# Patient Record
Sex: MALE
[De-identification: names, ages, dates, MRNs, and addresses within clinical notes are randomized per-mention and may not be internally consistent; named-entity substitution may affect disease eponyms.]

---

## 2018-07-11 ENCOUNTER — HOSPITAL ENCOUNTER (INPATIENT)
Dept: HOSPITAL 31 - C.ER | Age: 78
LOS: 5 days | Discharge: HOME | DRG: 286 | End: 2018-07-16
Attending: INTERNAL MEDICINE | Admitting: INTERNAL MEDICINE
Payer: MEDICARE

## 2018-07-11 DIAGNOSIS — Z90.49: ICD-10-CM

## 2018-07-11 DIAGNOSIS — E11.9: ICD-10-CM

## 2018-07-11 DIAGNOSIS — I11.0: Primary | ICD-10-CM

## 2018-07-11 DIAGNOSIS — E78.5: ICD-10-CM

## 2018-07-11 DIAGNOSIS — N40.0: ICD-10-CM

## 2018-07-11 DIAGNOSIS — Z79.84: ICD-10-CM

## 2018-07-11 DIAGNOSIS — D61.818: ICD-10-CM

## 2018-07-11 DIAGNOSIS — I50.31: ICD-10-CM

## 2018-07-11 DIAGNOSIS — E78.00: ICD-10-CM

## 2018-07-11 DIAGNOSIS — I08.0: ICD-10-CM

## 2018-07-11 DIAGNOSIS — I25.10: ICD-10-CM

## 2018-07-11 DIAGNOSIS — I71.03: ICD-10-CM

## 2018-07-11 LAB
ALBUMIN SERPL-MCNC: 4.5 G/DL (ref 3.5–5)
ALBUMIN/GLOB SERPL: 1.8 {RATIO} (ref 1–2.1)
ALT SERPL-CCNC: 21 U/L (ref 21–72)
ANISOCYTOSIS BLD QL SMEAR: SLIGHT
APTT BLD: 32 SECONDS (ref 21–34)
AST SERPL-CCNC: 18 U/L (ref 17–59)
BASOPHILS # BLD AUTO: 0 K/UL (ref 0–0.2)
BASOPHILS NFR BLD: 0 % (ref 0–2)
BUN SERPL-MCNC: 21 MG/DL (ref 9–20)
CALCIUM SERPL-MCNC: 9.1 MG/DL (ref 8.6–10.4)
CK MB SERPL-MCNC: 0.99 NG/ML (ref 0–3.38)
EOSINOPHIL # BLD AUTO: 0 K/UL (ref 0–0.7)
EOSINOPHIL NFR BLD: 0 % (ref 0–4)
ERYTHROCYTE [DISTWIDTH] IN BLOOD BY AUTOMATED COUNT: 18.4 % (ref 11.5–14.5)
GFR NON-AFRICAN AMERICAN: 42
HGB BLD-MCNC: 9.2 G/DL (ref 12–18)
HYPOCHROMIC: SLIGHT
INR PPP: 1.4
LYMPHOCYTE: 26 % (ref 20–40)
LYMPHOCYTES # BLD AUTO: 1.3 K/UL (ref 1–4.3)
LYMPHOCYTES NFR BLD AUTO: 31.1 % (ref 20–40)
MCH RBC QN AUTO: 25.6 PG (ref 27–31)
MCHC RBC AUTO-ENTMCNC: 32.5 G/DL (ref 33–37)
MCV RBC AUTO: 78.8 FL (ref 80–94)
MONOCYTE: 8 % (ref 0–10)
MONOCYTES # BLD: 0.9 K/UL (ref 0–0.8)
MONOCYTES NFR BLD: 21.3 % (ref 0–10)
NEUTROPHILS # BLD: 1.9 K/UL (ref 1.8–7)
NEUTROPHILS NFR BLD AUTO: 47.6 % (ref 50–75)
NEUTROPHILS NFR BLD AUTO: 66 % (ref 50–75)
NRBC BLD AUTO-RTO: 0.1 % (ref 0–2)
OVALOCYTES BLD QL SMEAR: SLIGHT
PLATELET # BLD EST: (no result) 10*3/UL
PLATELET # BLD: 81 K/UL (ref 130–400)
PMV BLD AUTO: 9.5 FL (ref 7.2–11.7)
POIKILOCYTOSIS BLD QL SMEAR: SLIGHT
POLYCHROMIC: SLIGHT
PROTHROMBIN TIME: 14.9 SECONDS (ref 9.7–12.2)
RBC # BLD AUTO: 3.6 MIL/UL (ref 4.4–5.9)
TEARDROP CELLS: SLIGHT
TOTAL CELLS COUNTED BLD: 100
TROPONIN I SERPL-MCNC: 0.02 NG/ML (ref 0–0.12)
WBC # BLD AUTO: 4 K/UL (ref 4.8–10.8)

## 2018-07-11 RX ADMIN — NITROGLYCERIN SCH EA: 20 OINTMENT TOPICAL at 18:27

## 2018-07-11 NOTE — RAD
Date of service: 



07/11/2018



PROCEDURE:  CHEST RADIOGRAPH, 1 VIEW



HISTORY:

SOB



COMPARISON:

CT angiography of the chest, abdomen pelvis performed 12/30/2015.



FINDINGS:



LUNGS:

Pulmonary vascular congestion. Questionable patchy bibasilar 

infiltrates versus chronic changes.



PLEURA:

No pneumothorax or pleural fluid seen.



CARDIOVASCULAR:

Atherosclerotic aortic calcifications.  Cardiomediastinal silhouette 

enlarged.



OSSEOUS STRUCTURES:

Degenerative changes.



VISUALIZED UPPER ABDOMEN:

Normal.



OTHER FINDINGS:

None. 



IMPRESSION:

Pulmonary vascular congestion.  Questionable patchy bibasilar 

infiltrates versus chronic changes.

## 2018-07-11 NOTE — C.PDOC
History Of Present Illness





Patient presents to ED c/o SOB worse with laying flat over the past 1 week.  He 

admits to nonproductive cough, but denies chest pain, fever, abdominal pain, 

palpitations, nausea/vomiting, leg edema.  


Time Seen by Provider: 07/11/18 11:45


Chief Complaint (Nursing): Shortness Of Breath


History Per: Patient


History/Exam Limitations: no limitations


Onset/Duration Of Symptoms: Days


Current Symptoms Are (Timing): Still Present


Exacerbating Factor(s): Coughing


Current Respiratory Medications: See Home Med List


Severity: Moderate





Past Medical History


Reviewed: Historical Data, Nursing Documentation, Vital Signs


Vital Signs: 


 Last Vital Signs











Temp      


 


Pulse      


 


Resp  20   07/11/18 12:17


 


BP      


 


Pulse Ox  98   07/11/18 12:57














- Medical History


PMH: Arthritis, Benign Prostatic Hyperplasia, Diabetes (type 2), HTN, 

Hypercholesterolemia


   Denies: Chronic Kidney Disease


Surgical History: Appendectomy





- [facility] Procedures








VACCINATION NEC (04/24/15)








Family History: States: No Known Family Hx





- Social History


Hx Tobacco Use: No


Hx Alcohol Use: No


Hx Substance Use: No





- Immunization History


Hx Tetanus Toxoid Vaccination: No


Hx Influenza Vaccination: No


Hx Pneumococcal Vaccination: No





Review Of Systems


Constitutional: Negative for: Fever, Chills


Cardiovascular: Positive for: Orthopnea.  Negative for: Chest Pain, Palpitations


Respiratory: Positive for: Cough, Shortness of Breath


Gastrointestinal: Negative for: Nausea, Vomiting, Abdominal Pain, Diarrhea


Skin: Negative for: Rash


Neurological: Negative for: Weakness, Numbness





Physical Exam





- Physical Exam


Appears: Well, Non-toxic, No Acute Distress, Other (speaking in full sentences)


Head: Atraumatic, Normacephalic


Eye(s): bilateral: Normal Inspection


Oral Mucosa: Moist


Cardiovascular: Rhythm Regular


Respiratory: No Accessory Muscle Use, Rales (mild at bases B/L ), Rhonchi (

scattered rhonchi B/L ), No Wheezing


Gastrointestinal/Abdominal: Normal Exam, Bowel Sounds, Soft, No Tenderness


Extremity: Normal ROM, No Pedal Edema, No Calf Tenderness


Neurological/Psych: Oriented x3





ED Course And Treatment





- Laboratory Results


Result Diagrams: 


 07/11/18 12:34





 07/11/18 12:34


ECG: Interpreted By Me, Viewed By Me (sinus bradycardia 58 bpm, normal axis, no 

acute ST/T wave changes)


ECG Interpretation: No Acute Changes


O2 Sat by Pulse Oximetry: 98 (RA)


Pulse Ox Interpretation: Normal


Progress Note: Blood work, CXR, EKG, ordered and reviewed.





Disposition





- Disposition


Disposition Time: 13:00


Condition: STABLE


Forms:  [facility] [facility] (English)





- Clinical Impression


Clinical Impression: 


 Dyspnea, Orthopnea








Physician Patient Turnover


Patient Signed Over To: Racquel Watson


Handoff Comments: pending labs, reassess

## 2018-07-12 RX ADMIN — NITROGLYCERIN SCH EA: 20 OINTMENT TOPICAL at 12:37

## 2018-07-12 RX ADMIN — NITROGLYCERIN SCH EA: 20 OINTMENT TOPICAL at 05:26

## 2018-07-12 RX ADMIN — NITROGLYCERIN SCH EA: 20 OINTMENT TOPICAL at 00:22

## 2018-07-12 RX ADMIN — NITROGLYCERIN SCH EA: 20 OINTMENT TOPICAL at 18:27

## 2018-07-12 RX ADMIN — PANTOPRAZOLE SODIUM SCH MG: 40 TABLET, DELAYED RELEASE ORAL at 09:48

## 2018-07-12 NOTE — CP.PCM.PN
Subjective





- Date & Time of Evaluation


Date of Evaluation: 07/12/18


Time of Evaluation: 19:55





- Subjective


Subjective: 





Patient with symptomatic valvular heart disease


Decreased EF





FABIO and Cath tomorrow to assess valves and Caronaries





Objective





- Vital Signs/Intake and Output


Vital Signs (last 24 hours): 


 











Temp Pulse Resp BP Pulse Ox


 


 97.3 F L  59 L  20   144/84   98 


 


 07/12/18 15:39  07/12/18 15:39  07/12/18 15:39  07/12/18 15:39  07/12/18 15:39








Intake and Output: 


 











 07/12/18 07/13/18





 18:59 06:59


 


Intake Total 350 


 


Balance 350 














- Medications


Medications: 


 Current Medications





Aspirin (Ecotrin)  81 mg PO DAILY [facility]


   Last Admin: 07/12/18 09:45 Dose:  81 mg


Furosemide (Lasix)  20 mg IVP DAILY [facility]


   Last Admin: 07/12/18 09:44 Dose:  20 mg


Furosemide (Lasix)  20 mg IVP ONCE ONE


   Stop: 07/13/18 09:01


Heparin Sodium (Porcine) (Heparin)  5,000 units SC Q8 [facility]


   Last Admin: 07/12/18 13:23 Dose:  5,000 units


Sodium Chloride (Sodium Chloride 0.9%)  1,000 mls @ 50 mls/hr IV .Q20H [facility]


Losartan Potassium (Cozaar)  25 mg PO DAILY [facility]


   Last Admin: 07/12/18 09:45 Dose:  25 mg


Nitroglycerin (Nitro-Bid 2% Oint)  1 ea TOP Q6 [facility]


   Last Admin: 07/12/18 18:27 Dose:  1 ea


Pantoprazole Sodium (Protonix Ec Tab)  40 mg PO DAILY [facility]


   Last Admin: 07/12/18 09:48 Dose:  40 mg











- Labs


Labs: 


 





 07/11/18 12:34 





 07/11/18 12:34 





 











PT  14.9 SECONDS (9.7-12.2)  H  07/11/18  12:34    


 


INR  1.4   07/11/18  12:34    


 


APTT  32 SECONDS (21-34)   07/11/18  12:34

## 2018-07-12 NOTE — HP
HISTORY OF PRESENT ILLNESS:  A 78-year-old gentleman who was brought in

with a history of shortness of breath, that started last night.  He has

multiple medical problems which includes history of hypertension, diabetes,

high cholesterol, atherosclerosis.  The patient also carries a diagnosis of

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 

This was diagnosed in 08/2015, three years ago.  He was in usual state of

health up until last night when he could not sleep, had a cough and

shortness of breath, came to the emergency room but his chest x-ray had

shown mild vascular congestion and the proBNP was elevated to 9000.  His

hemoglobin was 9.2.



MEDICATIONS AT HOME:  Include baby aspirin one a day, Crestor 20 one a day,

Lovaza, metformin 500 mg twice a day.



PAST MEDICAL HISTORY:  He had a cardiac cath done many years ago.  He had a

nonobstructive coronary artery disease.  Echocardiogram was recently done,

few months ago, had shown dilated LV, mild to moderate AI, mild to moderate

MR, grade 2 LV diastolic dysfunction.  He previously also has been seen by

Dr. Chapa.



FAMILY HISTORY:  Negative for premature coronary artery disease.



REVIEW OF SYSTEMS:  No generalized weakness, no fever, no chills, no

dizziness, except when he walks around in the hot weather.  Otherwise,

unremarkable.  No syncope.  Nonproductive cough for 2 days.  No hemoptysis.

Denies any chest pain.  Able to walk and do routine activities, 3 to 4

block on a level ground.  No palpitations.  Neurologically, no TIAs, no

CVAs.  No peptic ulcer disease.  No bleeding disorders.  Does have a

history of arthritis and back pains and knee pains.  There is no evidence

of depression.  Denies any urinary complaints.  He does have erectile

dysfunction, has been seen and evaluated by urologist in the past.



PHYSICAL EXAMINATION:

GENERAL:  Shows elderly gentleman, conscious, alert, well oriented, in no

acute distress.

VITAL SIGNS:  He is 5 feet 7 inches, weighs 192 pounds.  His blood pressure

is 136/70, heart rate of 56 and regular, sinus rhythm on the telemetry,

respiratory rate of 20, afebrile.

HEENT:  No neck vein distentions.  No carotid bruits.

MOUTH:  Mouth shows absence of all teeth.

EYES:  No pallor, no icterus.

HEAD:  Normocephalic.

LUNGS:  Show few basilar rales.  Otherwise unremarkable.

HEART:  PMI is not localized.  S1 and S2 appear normal.  There are no

gallops.  There is a grade 2/6 holosystolic murmur in mitral area.  No

diastolic murmur heard.  No S3 gallop.

ABDOMEN:  Soft, nontender.

EXTREMITIES:  No cyanosis, clubbing, or edema.  Distal pulses are intact.

NEUROLOGIC:  Awake, alert, oriented x3.  No focal signs.

PSYCHIATRIC:  No evidence of depression.



LABORATORY DATA:  Hemoglobin is 9.2.  Chest x-ray shows mild congestion. 

Chem-7 is unremarkable.  Bilirubin is elevated at 2.  EKG:  Sinus

bradycardia, LVH, nonspecific ST-T changes.  Troponins are not available.



ASSESSMENT:  This is a 78-year-old gentleman with a history of chronic

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

hypertension, diabetes, atherosclerosis, who is presenting with mild

congestive heart failure, appears to be more of diastolic, hemodynamically

stable.



PLAN:  At this point is to give small dose of diuretic.  We will repeat

echocardiogram with a Doppler study and repeat CT, angio of chest and

abdomen.  May need vascular consultation.  Care of plan was explained to

the patient.  Also, we will do anemia workup.





__________________________________________

Sathya Jacinto MD





DD:  07/11/2018 14:40:11

DT:  07/11/2018 23:09:11

Job # [id number]

## 2018-07-12 NOTE — CARD
--------------- APPROVED REPORT --------------





Date of service: 07/11/2018



EXAM: Two-dimensional and M-mode echocardiogram with Doppler and 

color Doppler.



Other Information 

Quality : GoodRhythm : 



INDICATION

Aortic Valve Disease Dyspnea Chest Pain Congestive Heart Failure 

Aortic Dissection



2D DIMENSIONS 

IVSd1.6   (0.7-1.1cm)LVDd5.5   (3.9-5.9cm)

PWd1.2   (0.7-1.1cm)LVDs4.7   (2.5-4.0cm)

FS (%) 14.8   %LVEF (%)50.0   (>50%)



M-Mode DIMENSIONS 

Left Atrium (MM)3.79   (2.5-4.0cm)IVSd1.22   (0.7-1.1cm)

Aortic Root4.18   (2.2-3.7cm)LVDd6.43   (4.0-5.6cm)

Aortic Cusp Exc.1.90   (1.5-2.0cm)PWd1.22   (0.7-1.1cm)

FS (%) 41   %LVDs3.79   (2.0-3.8cm)

LVEF (%)50   (>50%)



Aortic Valve

AI P 1/2 [id number]



Mitral Valve

MV E Goslfgez81.1cm/sMV A Kfvwqaqg35.4cm/sE/A ratio2.1



TDI

E/Lateral E'0.0E/Medial E'0.0



Tricuspid Valve

TR Peak [id number]/sTR Peak [id number]



 LEFT VENTRICLE 

The left ventricle is normal size.

There is normal left ventricular wall thickness. 

The left ventricular function is normal.

The left ventricular ejection fraction is within the normal range.

No regional wall motion abnormalities noted.

The left ventricular diastolic function is normal.

No left ventricle thrombus noted on this study.

There is no ventricular septal defect visualized.

There is no left ventricular aneurysm. 

There is no mass noted in the left ventricle.



 RIGHT VENTRICLE 

The right ventricle is normal size.

There is normal right ventricular wall thickness.

The right ventricular systolic function is normal.



 ATRIA 

The left atrium size is normal.

The right atrium size is normal.

The interatrial septum is intact with no evidence for an atrial 

septal defect.



 AORTIC VALVE 

The aortic valve is mildly to moderately thickened.

There is moderate aortic regurgitation.

There is no aortic valvular stenosis. 

There is no aortic valvular vegetation.



 MITRAL VALVE 

The mitral valve is normal in structure and function.

There is no evidence of mitral valve prolapse.

There is no mitral valve stenosis. 

The mitral regurgitant jet is posteriorly directed, which is 

consistent with anterior leaflet pathology.

The mitral regurgitant jet is anteriorly directed, which is 

consistent with posterior leaflet pathology.



 TRICUSPID VALVE 

The tricuspid valve is normal in structure and function.

There is moderate tricuspid regurgitation.

Right ventricular systolic pressure is estimated at greater than 60 

mmHg. 

There is no tricuspid valve prolapse or vegetation.

There is no tricuspid valve stenosis. 



 PULMONIC VALVE 

The pulmonary valve is normal in structure and function.

There is mild pulmonic valvular regurgitation. 

There is no pulmonic valvular stenosis.



 GREAT VESSELS 

The aortic root is normal in size.

The ascending aorta is normal in size.

The pulmonary artery is normal.

The IVC is dilated.

The IVC collapses <50% with inspiration.



 PERICARDIAL EFFUSION 

The pericardium appears normal.

There is no pleural effusion.



<Conclusion>

The left ventricular function is normal.

The left ventricular ejection fraction is within the normal range.

No regional wall motion abnormalities noted.

There is moderate aortic regurgitation.

The mitral regurgitant jet is posteriorly directed, which is 

consistent with anterior leaflet pathology.

The mitral regurgitant jet is anteriorly directed, which is 

consistent with posterior leaflet pathology.

There is moderate tricuspid regurgitation.

Right ventricular systolic pressure is estimated at greater than 60 

mmHg.

## 2018-07-12 NOTE — CP.PCM.PN
Subjective





- Date & Time of Evaluation


Date of Evaluation: 07/12/18


Time of Evaluation: 12:57





- Subjective


Subjective: 





no sob,no chest pains





Objective





- Vital Signs/Intake and Output


Vital Signs (last 24 hours): 


 











Temp Pulse Resp BP Pulse Ox


 


 97.8 F   58 L  20   157/80 H  96 


 


 07/12/18 07:40  07/12/18 09:41  07/12/18 07:40  07/12/18 09:44  07/12/18 07:40








Intake and Output: 


 











 07/12/18 07/12/18





 06:59 18:59


 


Output Total 425 


 


Balance -425 














- Medications


Medications: 


 Current Medications





Aspirin (Ecotrin)  81 mg PO DAILY [facility]


   Last Admin: 07/12/18 09:45 Dose:  81 mg


Furosemide (Lasix)  20 mg IVP DAILY [facility]


   Last Admin: 07/12/18 09:44 Dose:  20 mg


Heparin Sodium (Porcine) (Heparin)  5,000 units SC Q8 [facility]


   Last Admin: 07/12/18 05:27 Dose:  5,000 units


Sodium Chloride (Sodium Chloride 0.9%)  1,000 mls @ 50 mls/hr IV .Q20H [facility]


Losartan Potassium (Cozaar)  25 mg PO DAILY [facility]


   Last Admin: 07/12/18 09:45 Dose:  25 mg


Nitroglycerin (Nitro-Bid 2% Oint)  1 ea TOP Q6 [facility]


   Last Admin: 07/12/18 12:37 Dose:  1 ea


Pantoprazole Sodium (Protonix Ec Tab)  40 mg PO DAILY [facility]


   Last Admin: 07/12/18 09:48 Dose:  40 mg











- Labs


Labs: 


 





 07/11/18 12:34 





 07/11/18 12:34 





 











PT  14.9 SECONDS (9.7-12.2)  H  07/11/18  12:34    


 


INR  1.4   07/11/18  12:34    


 


APTT  32 SECONDS (21-34)   07/11/18  12:34    














- Constitutional


Appears: No Acute Distress





- Head Exam


Head Exam: NORMOCEPHALIC





- Eye Exam


Eye Exam: Normal appearance





- Neck Exam


Neck Exam: Normal Inspection





- Respiratory Exam


Respiratory Exam: Clear to Ausculation Bilateral





- Cardiovascular Exam


Cardiovascular Exam: REGULAR RHYTHM, Murmur





- GI/Abdominal Exam


GI & Abdominal Exam: Soft





- Extremities Exam


Extremities Exam: absent: Pedal Edema





- Neurological Exam


Neurological Exam: Alert, Oriented x3





Assessment and Plan





- Assessment and Plan (Free Text)


Assessment: 





echo noted.2-3 + ai,2-3 + mr,tr with severe pul htn.will obtain cynthia.given 

dilated lv with lvef of about 50% may need avr? & mitral ring.hem consult.

## 2018-07-12 NOTE — CP.PCM.CON
History of Present Illness





- History of Present Illness


History of Present Illness: 





78 year old male with a history of DM, HTN, HL, chronic aortic dissection type B

, presenting with shortness of breath, with pancytopenia.  The patient notes to 

low platelets in the past but denies abnormal bleeding and bruising.  He denies 

alcohol intake and notes his blood counts have been like this for years.  His 

breathing is improved.  





Past medical history:  DM, HTN, HL, chronic aortic dissection type B





Past surgical history:  Appendectomy





Family history:  Denies hematologic and oncologic problems





Social history:  Denies tobacco, alcohol, and illicit drug use





Allergies:  NKDA





Review of systems:  All remaining review of systems including HEENT, 

cardiovascular, respiratory, gastrointestinal, genitourinary, musculoskeletal, 

dermatologic, neurologic, and psychiatric are negative unless mentioned in the 

HPI.





Past Patient History





- Infectious Disease


Hx of Infectious Diseases: None





- Tetanus Immunizations


Tetanus Immunization: Unknown





- Past Medical History & Family History


Past Medical History?: Yes





- Past Social History


Smoking Status: Never Smoked





- CARDIAC


Hx Hypercholesterolemia: Yes


Hx Hypertension: Yes





- PULMONARY


Hx Respiratory Disorders: No





- NEUROLOGICAL


Hx Neurological Disorder: No





- HEENT


Hx HEENT Problems: No





- RENAL


Hx Chronic Kidney Disease: No





- ENDOCRINE/METABOLIC


Hx Endocrine Disorders: Yes


Hx Diabetes Mellitus Type 2: Yes





- HEMATOLOGICAL/ONCOLOGICAL


Hx Blood Disorders: No





- INTEGUMENTARY


Hx Dermatological Problems: No





- MUSCULOSKELETAL/RHEUMATOLOGICAL


Hx Arthritis: Yes


Hx Falls: No





- GASTROINTESTINAL


Hx Gastrointestinal Disorders: No





- GENITOURINARY/GYNECOLOGICAL


Hx Genitourinary Disorders: Yes


Hx Prostate Problems: Yes (incontinent)





- PSYCHIATRIC


Hx Substance Use: No





- SURGICAL HISTORY


Hx Appendectomy: Yes





- ANESTHESIA


Hx Anesthesia: Yes


Hx Anesthesia Reactions: No


Hx Malignant Hyperthermia: No





Meds


Allergies/Adverse Reactions: 


 Allergies











Allergy/AdvReac Type Severity Reaction Status Date / Time


 


polle Allergy Intermediate WHEEZING Uncoded 07/11/18 11:57














- Medications


Medications: 


 Current Medications





Aspirin (Ecotrin)  81 mg PO DAILY [facility]


   Last Admin: 07/12/18 09:45 Dose:  81 mg


Furosemide (Lasix)  20 mg IVP DAILY [facility]


   Last Admin: 07/12/18 09:44 Dose:  20 mg


Heparin Sodium (Porcine) (Heparin)  5,000 units SC Q8 [facility]


   Last Admin: 07/12/18 13:23 Dose:  5,000 units


Sodium Chloride (Sodium Chloride 0.9%)  1,000 mls @ 50 mls/hr IV .Q20H [facility]


Losartan Potassium (Cozaar)  25 mg PO DAILY [facility]


   Last Admin: 07/12/18 09:45 Dose:  25 mg


Nitroglycerin (Nitro-Bid 2% Oint)  1 ea TOP Q6 [facility]


   Last Admin: 07/12/18 12:37 Dose:  1 ea


Pantoprazole Sodium (Protonix Ec Tab)  40 mg PO DAILY [facility]


   Last Admin: 07/12/18 09:48 Dose:  40 mg











Physical Exam





- Head Exam


Head Exam: ATRAUMATIC





- Eye Exam


Eye Exam: Normal appearance





- ENT Exam


ENT Exam: Mucous Membranes Dry





- Respiratory Exam


Respiratory Exam: NORMAL BREATHING PATTERN





- Cardiovascular Exam


Cardiovascular Exam: +S1, +S2





- GI/Abdominal Exam


GI & Abdominal Exam: Normal Bowel Sounds





- Extremities Exam


Extremities exam: Positive for: normal inspection





Results





- Vital Signs


Recent Vital Signs: 


 Last Vital Signs











Temp  97.8 F   07/12/18 07:40


 


Pulse  58 L  07/12/18 09:41


 


Resp  20   07/12/18 07:40


 


BP  157/80 H  07/12/18 09:44


 


Pulse Ox  96   07/12/18 07:40














- Labs


Result Diagrams: 


 07/11/18 12:34





 07/11/18 12:34


Labs: 


 Laboratory Results - last 24 hr











  07/11/18 07/12/18 07/12/18





  21:35 06:21 11:50


 


POC Glucose (mg/dL)  141 H  120 H  175 H














Assessment & Plan


(1) Pancytopenia


Assessment and Plan: 


will check HIV and hepatitis panel


retic count, b12, folate, ferritin, hgb electropheresis to further characterize 

anemia


cytopenias appear chronic since 2015 in our system; no plan for bone marrow 

evaluation at this time


outpatient f/u





Thank you for this interesting consult.


Status: Acute

## 2018-07-13 LAB
BNP SERPL-MCNC: 5080 PG/ML (ref 0–900)
BUN SERPL-MCNC: 22 MG/DL (ref 9–20)
CALCIUM SERPL-MCNC: 9 MG/DL (ref 8.6–10.4)
ERYTHROCYTE [DISTWIDTH] IN BLOOD BY AUTOMATED COUNT: 18.6 % (ref 11.5–14.5)
FERRITIN SERPL-MCNC: 40.1 NG/ML
FOLATE SERPL-MCNC: 15 NG/ML
GFR NON-AFRICAN AMERICAN: 39
HDLC SERPL-MCNC: 35 MG/DL (ref 30–70)
HEPATITIS A IGM: NEGATIVE
HEPATITIS B CORE AB: NEGATIVE
HEPATITIS C ANTIBODY: NEGATIVE
HGB BLD-MCNC: 10 G/DL (ref 12–18)
INR PPP: 1.3
LDLC SERPL-MCNC: 62 MG/DL (ref 0–129)
MCH RBC QN AUTO: 26.2 PG (ref 27–31)
MCHC RBC AUTO-ENTMCNC: 33.2 G/DL (ref 33–37)
MCV RBC AUTO: 78.9 FL (ref 80–94)
PLATELET # BLD: 78 K/UL (ref 130–400)
PMV BLD AUTO: 9.5 FL (ref 7.2–11.7)
PROTHROMBIN TIME: 14.5 SECONDS (ref 9.7–12.2)
RBC # BLD AUTO: 3.83 MIL/UL (ref 4.4–5.9)
VIT B12 SERPL-MCNC: 619 PG/ML (ref 239–931)
WBC # BLD AUTO: 4.3 K/UL (ref 4.8–10.8)

## 2018-07-13 RX ADMIN — NITROGLYCERIN SCH: 20 OINTMENT TOPICAL at 05:44

## 2018-07-13 RX ADMIN — NITROGLYCERIN SCH EA: 20 OINTMENT TOPICAL at 00:10

## 2018-07-13 RX ADMIN — NITROGLYCERIN SCH EA: 20 OINTMENT TOPICAL at 05:41

## 2018-07-13 RX ADMIN — PANTOPRAZOLE SODIUM SCH MG: 40 TABLET, DELAYED RELEASE ORAL at 18:24

## 2018-07-13 RX ADMIN — NITROGLYCERIN SCH EA: 20 OINTMENT TOPICAL at 18:23

## 2018-07-13 NOTE — CARD
--------------- APPROVED REPORT --------------





Date of service: 07/11/2018



EKG Measurement

Heart [id number]

MT 180P23

[id number]

[id number]

[id number]



<Conclusion>

Sinus bradycardia,lvh.

non specific st t changes.

Otherwise normal ECG

## 2018-07-13 NOTE — CP.PCM.PN
Subjective





- Date & Time of Evaluation


Date of Evaluation: 07/13/18


Time of Evaluation: 14:45





- Subjective


Subjective: 





cath,cynthia noted.





Objective





- Vital Signs/Intake and Output


Vital Signs (last 24 hours): 


 











Temp Pulse Resp BP Pulse Ox


 


 98.0 F   73   18   186/93 H  100 


 


 07/13/18 07:00  07/13/18 07:00  07/13/18 07:00  07/13/18 07:00  07/13/18 07:00








Intake and Output: 


 











 07/13/18 07/13/18





 06:59 18:59


 


Output Total 125 


 


Balance -125 














- Medications


Medications: 


 Current Medications





Aspirin (Ecotrin)  81 mg PO DAILY [facility]


   Last Admin: 07/12/18 09:45 Dose:  81 mg


Furosemide (Lasix)  20 mg IVP DAILY [facility]


   Last Admin: 07/12/18 09:44 Dose:  20 mg


Heparin Sodium (Porcine) (Heparin)  5,000 units SC Q8 [facility]


   Last Admin: 07/13/18 05:42 Dose:  Not Given


Sodium Chloride (Sodium Chloride 0.9%)  1,000 mls @ 50 mls/hr IV .Q20H [facility]


   Stop: 07/14/18 01:31


Losartan Potassium (Cozaar)  25 mg PO DAILY [facility]


   Last Admin: 07/12/18 09:45 Dose:  25 mg


Nitroglycerin (Nitro-Bid 2% Oint)  1 ea TOP Q6 [facility]


   Last Admin: 07/13/18 05:44 Dose:  Not Given


Pantoprazole Sodium (Protonix Ec Tab)  40 mg PO DAILY [facility]


   Last Admin: 07/12/18 09:48 Dose:  40 mg











- Labs


Labs: 


 





 07/13/18 07:07 





 07/13/18 07:07 





 











PT  14.5 SECONDS (9.7-12.2)  H  07/13/18  07:07    


 


INR  1.3   07/13/18  07:07    


 


APTT  32 SECONDS (21-34)   07/11/18  12:34    














- Constitutional


Appears: In Acute Distress





- Neck Exam


Neck Exam: Normal Inspection





- Respiratory Exam


Respiratory Exam: Clear to Ausculation Bilateral





- Cardiovascular Exam


Cardiovascular Exam: REGULAR RHYTHM, Murmur





- GI/Abdominal Exam


GI & Abdominal Exam: Soft





- Extremities Exam


Extremities Exam: absent: Pedal Edema





- Neurological Exam


Neurological Exam: Alert, Oriented x3





Assessment and Plan





- Assessment and Plan (Free Text)


Assessment: 





diss with dr valencia.medical rx for now.severe mr,3 +,moderate ai,type b 

dissection.? mitral clip,percutaneously.

## 2018-07-14 LAB
MCH RBC QN AUTO: 25.4 PG (ref 27–33)
MCV RBC: 80.1 FL (ref 80–100)

## 2018-07-14 RX ADMIN — PANTOPRAZOLE SODIUM SCH MG: 40 TABLET, DELAYED RELEASE ORAL at 09:02

## 2018-07-14 RX ADMIN — NITROGLYCERIN SCH: 20 OINTMENT TOPICAL at 18:00

## 2018-07-14 RX ADMIN — NITROGLYCERIN SCH: 20 OINTMENT TOPICAL at 12:00

## 2018-07-14 RX ADMIN — NITROGLYCERIN SCH: 20 OINTMENT TOPICAL at 05:50

## 2018-07-14 RX ADMIN — NITROGLYCERIN SCH: 20 OINTMENT TOPICAL at 23:45

## 2018-07-14 RX ADMIN — NITROGLYCERIN SCH: 20 OINTMENT TOPICAL at 00:00

## 2018-07-14 NOTE — CP.PCM.PN
Subjective





- Date & Time of Evaluation


Date of Evaluation: 07/13/18


Time of Evaluation: 19:30





- Subjective


Subjective: 





Patient s/p FABIO and Cardiac cath





1. Non obstructive Coronaries


2. Moderate to severe MR


3. Moderate AI








Medical management for now for diastolic CHF and valvular heart disease


If symptoms persist despite medical therapy he will likely need Mitral valve 

repair or Fabby clip (if qualifies)


Will follow as out patient 





Objective





- Vital Signs/Intake and Output


Vital Signs (last 24 hours): 


 











Temp Pulse Resp BP Pulse Ox


 


 97.9 F   62   20   150/56 L  97 


 


 07/14/18 04:25  07/14/18 04:25  07/14/18 04:25  07/14/18 04:25  07/14/18 04:25








Intake and Output: 


 











 07/13/18 07/14/18





 18:59 06:59


 


Intake Total  700


 


Output Total  325


 


Balance  375














- Medications


Medications: 


 Current Medications





Aspirin (Ecotrin)  81 mg PO DAILY [facility]


   Last Admin: 07/12/18 09:45 Dose:  81 mg


Furosemide (Lasix)  20 mg IVP DAILY [facility]


   Last Admin: 07/12/18 09:44 Dose:  20 mg


Heparin Sodium (Porcine) (Heparin)  5,000 units SC Q8 [facility]


   Last Admin: 07/14/18 05:28 Dose:  5,000 units


Losartan Potassium (Cozaar)  25 mg PO DAILY [facility]


   Last Admin: 07/13/18 18:22 Dose:  25 mg


Nitroglycerin (Nitro-Bid 2% Oint)  1 ea TOP Q6 [facility]


   Last Admin: 07/14/18 00:00 Dose:  Not Given


Pantoprazole Sodium (Protonix Ec Tab)  40 mg PO DAILY [facility]


   Last Admin: 07/13/18 18:24 Dose:  40 mg











- Labs


Labs: 


 





 07/13/18 07:07 





 07/13/18 07:07 





 











PT  14.5 SECONDS (9.7-12.2)  H  07/13/18  07:07    


 


INR  1.3   07/13/18  07:07    


 


APTT  32 SECONDS (21-34)   07/11/18  12:34

## 2018-07-15 LAB
BUN SERPL-MCNC: 24 MG/DL (ref 9–20)
CALCIUM SERPL-MCNC: 9.4 MG/DL (ref 8.6–10.4)
ERYTHROCYTE [DISTWIDTH] IN BLOOD BY AUTOMATED COUNT: 18.2 % (ref 11.5–14.5)
GFR NON-AFRICAN AMERICAN: 39
HGB BLD-MCNC: 10.8 G/DL (ref 12–18)
MCH RBC QN AUTO: 26.2 PG (ref 27–31)
MCHC RBC AUTO-ENTMCNC: 33 G/DL (ref 33–37)
MCV RBC AUTO: 79.4 FL (ref 80–94)
PLATELET # BLD: 92 K/UL (ref 130–400)
PMV BLD AUTO: 9.9 FL (ref 7.2–11.7)
RBC # BLD AUTO: 4.13 MIL/UL (ref 4.4–5.9)
WBC # BLD AUTO: 4.1 K/UL (ref 4.8–10.8)

## 2018-07-15 PROCEDURE — 4A023N7 MEASUREMENT OF CARDIAC SAMPLING AND PRESSURE, LEFT HEART, PERCUTANEOUS APPROACH: ICD-10-PCS | Performed by: INTERNAL MEDICINE

## 2018-07-15 PROCEDURE — B2151ZZ FLUOROSCOPY OF LEFT HEART USING LOW OSMOLAR CONTRAST: ICD-10-PCS | Performed by: INTERNAL MEDICINE

## 2018-07-15 PROCEDURE — B2111ZZ FLUOROSCOPY OF MULTIPLE CORONARY ARTERIES USING LOW OSMOLAR CONTRAST: ICD-10-PCS | Performed by: INTERNAL MEDICINE

## 2018-07-15 RX ADMIN — NITROGLYCERIN SCH: 20 OINTMENT TOPICAL at 05:53

## 2018-07-15 RX ADMIN — NITROGLYCERIN SCH: 20 OINTMENT TOPICAL at 12:05

## 2018-07-15 RX ADMIN — NITROGLYCERIN SCH: 20 OINTMENT TOPICAL at 23:45

## 2018-07-15 RX ADMIN — NITROGLYCERIN SCH: 20 OINTMENT TOPICAL at 17:20

## 2018-07-15 RX ADMIN — PANTOPRAZOLE SODIUM SCH MG: 40 TABLET, DELAYED RELEASE ORAL at 10:37

## 2018-07-15 NOTE — CARDCATH
PROCEDURE DATE:  07/13/2018



PROCEDURES:

1.  Left heart catheterization.

2.  Coronary angiogram.



CLINICAL INDICATIONS:

1.  Dyspnea on minimal exertion.

2.  Acute diastolic congestive heart failure.

3.  Mitral regurgitation.

4.  Aortic regurgitation.

5.  Hypertension.

6.  Hyperlipidemia.

7.  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

dissection.



REFERRING PHYSICIAN:  Dr. Sathya Jacinto.



PERFORMING PHYSICIAN:  Dr. Mickey Ervin.



PROCEDURE:  After informed consent, the patient was prepped and draped in

the usual sterile fashion.  Lidocaine 2% was given in the right wrist for

local anesthesia.  Using micropuncture technique, a 6-[place] sheath was

introduced into right atrial artery.



A 6-[place] Tiger catheter engaged into left main coronary artery.  Contrast

was injected and left coronary angiogram was done.



Then, a 6-[place] JR4 diagnostic catheter engaged into right coronary

artery.  Contrast injected and right coronary angiogram was done.  Then, a

pigtail introduced into left ventricle across the aortic valve.  LV

end-diastolic pressure measured.  Contrast was injected with power

injector.  LV angiogram was done.  Then, the catheter was pulled back. 

Gradient across the aortic valve was measured.



The patient tolerated the procedure well.



Postprocedure radiological supervision and radiological interpretation of

the images were done.



FINDINGS:

1.  Left main coronary artery is patent.

2.  Proximal LAD is patent, middle LAD is patent, distal LAD has some 30 to

40% narrowing, diagonal branches are patent.

3.  Proximal left circumflex coronary artery is patent, distal left

coronary artery has a 50% nonobstructive stenosis.  Obtuse marginal

branches are patent.

4.  Right coronary artery is ectatic and dominant artery, patent.

5.  LV ejection fraction is approximately 55%.  No gradient across the

aortic valve.  EDP is 32, 80%.  There is moderate to severe mitral

regurgitation noted.



CONCLUSION:

1.  Nonobstructive coronaries.

2.  Moderate to severe mitral regurgitation.

3.  Normal LV systolic function.







__________________________________________

Mickey Ervin MD



DD:  07/15/2018 13:06:50

DT:  07/15/2018 15:20:41

Job # [id number]

## 2018-07-15 NOTE — CP.PCM.PN
Subjective





- Date & Time of Evaluation


Date of Evaluation: 07/14/18


Time of Evaluation: 16:20





- Subjective


Subjective: 





Patient seen and evaluated


Denies chest pain and dyspnea





Feels better








Review Of Systems


Constitutional: Negative for: Fever, Chills


Cardiovascular: Positive for: Orthopnea.  Negative for: Chest Pain, Palpitations


Respiratory: Positive for: Cough, Shortness of Breath


Gastrointestinal: Negative for: Nausea, Vomiting, Abdominal Pain, Diarrhea


Skin: Negative for: Rash


Neurological: Negative for: Weakness, Numbness





Physical Exam





- Physical Exam


Appears: Well, Non-toxic, No Acute Distress, Other (speaking in full sentences)


Head: Atraumatic, Normacephalic


Eye(s): bilateral: Normal Inspection


Oral Mucosa: Moist


Cardiovascular: Rhythm Regular


Respiratory: No Accessory Muscle Use, Rales (mild at bases B/L ), Rhonchi (

scattered rhonchi B/L ), No Wheezing


Gastrointestinal/Abdominal: Normal Exam, Bowel Sounds, Soft, No Tenderness


Extremity: Normal ROM, No Pedal Edema, No Calf Tenderness


Neurological/Psych: Oriented x3














Objective





- Vital Signs/Intake and Output


Vital Signs (last 24 hours): 


 











Temp Pulse Resp BP Pulse Ox


 


 98.3 F   63   20   154/81 H  93 L


 


 07/15/18 04:45  07/15/18 04:45  07/15/18 04:45  07/15/18 04:45  07/15/18 04:45








Intake and Output: 


 











 07/15/18 07/15/18





 06:59 18:59


 


Intake Total 400 


 


Balance 400 














- Medications


Medications: 


 Current Medications





Aspirin (Ecotrin)  81 mg PO DAILY [facility]


   Last Admin: 07/14/18 09:02 Dose:  81 mg


Furosemide (Lasix)  20 mg IVP DAILY [facility]


   Last Admin: 07/14/18 09:03 Dose:  20 mg


Heparin Sodium (Porcine) (Heparin)  5,000 units SC Q8 [facility]


   Last Admin: 07/15/18 05:53 Dose:  5,000 units


Losartan Potassium (Cozaar)  25 mg PO DAILY [facility]


   Last Admin: 07/14/18 09:02 Dose:  25 mg


Nitroglycerin (Nitro-Bid 2% Oint)  1 ea TOP Q6 [facility]


   Last Admin: 07/14/18 23:45 Dose:  Not Given


Pantoprazole Sodium (Protonix Ec Tab)  40 mg PO DAILY [facility]


   Last Admin: 07/14/18 09:02 Dose:  40 mg











- Labs


Labs: 


 





 07/13/18 07:07 





 07/13/18 07:07 





 











PT  14.5 SECONDS (9.7-12.2)  H  07/13/18  07:07    


 


INR  1.3   07/13/18  07:07    


 


APTT  32 SECONDS (21-34)   07/11/18  12:34    














Assessment and Plan





- Assessment and Plan (Free Text)


Assessment: 





Patient s/p FABIO and Cardiac cath





1. Non obstructive Coronaries


2. Moderate to severe MR


3. Moderate AI








Medical management for now for diastolic CHF and valvular heart disease


If symptoms persist despite medical therapy he will likely need Mitral valve 

repair or Fabby clip (if qualifies)


Will follow as out patient 











Check Labs (BUN/Creatinine)


Liklely discharge Monday


Continue Lasix

## 2018-07-15 NOTE — CARD
--------------- APPROVED REPORT --------------





Date of service: 07/13/2018



EXAM: Transesophageal echocardiogram with color flow Doppler.



INDICATION

MR and AI



Mitral Valve

E/A ratio0.0



TDI

E/Lateral E'0.0E/Medial E'0.0



Reason For Test : Evaluate Mitral valve



PROCEDURE

After obtaining informed consent, patient underwent transesophageal 

echo in the [facility].

Type of Sedation : Conscious Sedation

Sedation was provided by anesthesiologist.

Sedation was achieved with   intravenously. 

The FABIO was performed  complications. 

Throughout the procedure, the blood pressure, pulse oximetry, cardiac 

rhythm, and rate were monitored.

The patient tolerated the procedure without adverse effects. Recovery 

from conscious sedation was uneventful and vital signs were stable.



 LEFT VENTRICLE 

The left ventricle is normal size.

There is moderate concentric left ventricular hypertrophy.

The left ventricular function is normal.

The left ventricular ejection fraction is within the normal range.

There is normal LV segmental wall motion.

No left ventricle thrombus noted on this study.

There is no ventricular septal defect visualized.

There is no left ventricular aneurysm. 



 RIGHT VENTRICLE 

The right ventricle is normal size.

The right ventricular systolic function is normal.



 ATRIA 

The left atrium is mildly dilated. 

The right atrium is mildly dilated. 

Inter atrial septum intact



 AORTIC VALVE 

Aortic valve Fibrosclrotic

There is moderate aortic regurgitation. 

There is no aortic valvular stenosis. 

There is no aortic valvular vegetation.



 MITRAL VALVE 

Piper valve mildy degenrative

A mild mitral valve prolapse is present.

There is no mitral valve stenosis.

Mitral regurgitation is moderate to severe. Patient has both 

posteriorly and anteriorly directed jets suggestive of likely 

bileaflet prolapse



 TRICUSPID VALVE 

The tricuspid valve is normal in structure.

There is moderate tricuspid regurgitation.

There is no tricuspid valve stenosis. 



 PULMONIC VALVE 

The pulmonary valve is normal in structure.

There is moderate pulmonic valvular regurgitation. 



 GREAT VESSELS 

Mildy dilated. Chronic Aortic dissection noted in descending thoracic 

Aorta



<Conclusion>

The left ventricular function is normal.

The left ventricular ejection fraction is within the normal range.

There is moderate concentric left ventricular hypertrophy.

The left atrium is mildly dilated.

Inter atrial septum intact

Aortic valve Fibrosclrotic

There is moderate aortic regurgitation.

A mild mitral valve prolapse is present.

Piper valve mildy degenrative

A mild mitral valve prolapse is present.

Mitral regurgitation is moderate to severe. Patient has both 

posteriorly and anteriorly directed jets suggestive of likely 

bileaflet prolapse

Mildy dilated. Chronic Aortic dissection noted in descending thoracic 

Aorta

Severe Pulmonary HTN

## 2018-07-16 VITALS — OXYGEN SATURATION: 98 % | RESPIRATION RATE: 18 BRPM | TEMPERATURE: 98.1 F | HEART RATE: 78 BPM

## 2018-07-16 VITALS — DIASTOLIC BLOOD PRESSURE: 75 MMHG | SYSTOLIC BLOOD PRESSURE: 179 MMHG

## 2018-07-16 LAB
HEMOGLOBIN A: 97.3 PERCENT (ref 96–?)
HGB A2 MFR BLD COLUMN CHROM: 1.7 PERCENT (ref 1.8–3.5)
HGB C MFR BLD: 0 PERCENT (ref 0–0)
HGB S MFR BLD: 0 PERCENT (ref 0–0)

## 2018-07-16 RX ADMIN — NITROGLYCERIN SCH: 20 OINTMENT TOPICAL at 05:56

## 2018-07-16 RX ADMIN — PANTOPRAZOLE SODIUM SCH MG: 40 TABLET, DELAYED RELEASE ORAL at 10:39

## 2018-07-16 RX ADMIN — NITROGLYCERIN SCH: 20 OINTMENT TOPICAL at 11:26

## 2018-07-17 NOTE — DS
CHIEF COMPLAINT:  A 78-year-old gentleman who was brought in with a mild

congestive heart failure, given IV diuretics with improvement. 

Echocardiogram had shown LVEF of about 50%.  He has a moderately dilated

LA.  LV was also dilated measuring 6.4 cm.  FABIO confirmed moderately severe

MR.  Moderate degree of aortic regurgitation was also noted.  The patient

has a type B dissection up to the renal arteries.  The patient had a

cardiac cath done, which showed nonobstructive coronary artery disease,

confirmed moderate AI and moderate-to-severe MR.  Medical therapy is to be

continued at this time and if further symptomatic, mitral clip

percutaneously may be considered.  The patient was seen and followed by Dr. Mickey Ervin for interventional cardiology and will follow as an

outpatient.  At this point, the patient is stable, ambulating around, vital

signs are stable.  He will be continued on all his medications at home,

which include Cozaar, Flomax, Protonix, metformin, and Lasix 40 mg p.o.

once a day was added.  I will see him back in one week's time.



FINAL DIAGNOSES:

1.  Congestive heart failure.

2.  Moderate-to-severe mitral regurgitation.

3.  Moderate aortic regurgitation.

4.  Type B aortic dissection.

5.  Hypertension.

6.  Diabetes.

7.  High cholesterol.



His platelet counts are 80,000 and hemoglobin is 10 gm.  The patient was

seen by Dr. Adorno for hematological evaluation.  Care of plan was explained

to the patient.







__________________________________________

Sathya Jacinto MD



DD:  07/16/2018 14:09:17

DT:  07/16/2018 16:55:58

Job # [id number]